# Patient Record
Sex: MALE | Race: OTHER | Employment: FULL TIME | URBAN - METROPOLITAN AREA
[De-identification: names, ages, dates, MRNs, and addresses within clinical notes are randomized per-mention and may not be internally consistent; named-entity substitution may affect disease eponyms.]

---

## 2018-08-04 PROCEDURE — 82570 ASSAY OF URINE CREATININE: CPT | Performed by: INTERNAL MEDICINE

## 2018-08-04 PROCEDURE — 82043 UR ALBUMIN QUANTITATIVE: CPT | Performed by: INTERNAL MEDICINE

## 2018-08-04 PROCEDURE — 80053 COMPREHEN METABOLIC PANEL: CPT | Performed by: INTERNAL MEDICINE

## 2018-08-04 PROCEDURE — 82306 VITAMIN D 25 HYDROXY: CPT | Performed by: INTERNAL MEDICINE

## 2018-08-04 PROCEDURE — 83036 HEMOGLOBIN GLYCOSYLATED A1C: CPT | Performed by: INTERNAL MEDICINE

## 2018-08-04 PROCEDURE — 80061 LIPID PANEL: CPT | Performed by: INTERNAL MEDICINE

## 2018-08-04 PROCEDURE — 84443 ASSAY THYROID STIM HORMONE: CPT | Performed by: INTERNAL MEDICINE

## 2018-08-04 PROCEDURE — 85025 COMPLETE CBC W/AUTO DIFF WBC: CPT | Performed by: INTERNAL MEDICINE

## 2020-07-27 PROCEDURE — 80061 LIPID PANEL: CPT | Performed by: INTERNAL MEDICINE

## 2020-07-27 PROCEDURE — 82306 VITAMIN D 25 HYDROXY: CPT | Performed by: INTERNAL MEDICINE

## 2020-07-27 PROCEDURE — 80053 COMPREHEN METABOLIC PANEL: CPT | Performed by: INTERNAL MEDICINE

## 2020-07-27 PROCEDURE — 85025 COMPLETE CBC W/AUTO DIFF WBC: CPT | Performed by: INTERNAL MEDICINE

## 2020-07-27 PROCEDURE — 84443 ASSAY THYROID STIM HORMONE: CPT | Performed by: INTERNAL MEDICINE

## 2020-07-27 PROCEDURE — 82570 ASSAY OF URINE CREATININE: CPT | Performed by: INTERNAL MEDICINE

## 2020-07-27 PROCEDURE — 82043 UR ALBUMIN QUANTITATIVE: CPT | Performed by: INTERNAL MEDICINE

## 2020-11-25 NOTE — LETTER
[address], [place]  Authorization for Invasive Procedures  1.  I hereby authorize Dr. Cesia Gaspar , my physician and whomever may be designated as the doctor's assistant, to perform the following operation and/or procedure: inc RTC 3mo labs and renal u/s prior 4. Should the need arise during my operation or immediate post-operative period; I also consent to the administration of blood and/or blood products.  Further, I understand that despite careful testing and screening of blood and blood products, I may still 9. Patients having a sterilization procedure: I understand that if the procedure is successful the results will be permanent and it will therefore be impossible for me to inseminate, conceive or bear children.  I also understand that the procedure is intend

## 2020-12-08 ENCOUNTER — APPOINTMENT (OUTPATIENT)
Dept: CT IMAGING | Facility: HOSPITAL | Age: 45
End: 2020-12-08
Attending: EMERGENCY MEDICINE
Payer: COMMERCIAL

## 2020-12-08 ENCOUNTER — HOSPITAL ENCOUNTER (EMERGENCY)
Facility: HOSPITAL | Age: 45
Discharge: HOME | End: 2020-12-08
Attending: EMERGENCY MEDICINE
Payer: COMMERCIAL

## 2020-12-08 ENCOUNTER — HOSPITAL ENCOUNTER (OUTPATIENT)
Age: 45
Discharge: ANOTHER HOSPITAL | End: 2020-12-08
Payer: COMMERCIAL

## 2020-12-08 VITALS
BODY MASS INDEX: 30.37 KG/M2 | HEART RATE: 105 BPM | DIASTOLIC BLOOD PRESSURE: 77 MMHG | SYSTOLIC BLOOD PRESSURE: 118 MMHG | HEIGHT: 66 IN | WEIGHT: 189 LBS | OXYGEN SATURATION: 99 % | TEMPERATURE: 98 F | RESPIRATION RATE: 18 BRPM

## 2020-12-08 VITALS
OXYGEN SATURATION: 98 % | TEMPERATURE: 98 F | SYSTOLIC BLOOD PRESSURE: 122 MMHG | HEART RATE: 80 BPM | WEIGHT: 189 LBS | RESPIRATION RATE: 18 BRPM | DIASTOLIC BLOOD PRESSURE: 77 MMHG | BODY MASS INDEX: 30.37 KG/M2 | HEIGHT: 66 IN

## 2020-12-08 DIAGNOSIS — L03.317 CELLULITIS AND ABSCESS OF BUTTOCK: ICD-10-CM

## 2020-12-08 DIAGNOSIS — K62.89 RECTAL PAIN: Primary | ICD-10-CM

## 2020-12-08 DIAGNOSIS — L03.317 CELLULITIS OF GLUTEAL REGION: Primary | ICD-10-CM

## 2020-12-08 DIAGNOSIS — L02.31 CELLULITIS AND ABSCESS OF BUTTOCK: ICD-10-CM

## 2020-12-08 PROCEDURE — 85025 COMPLETE CBC W/AUTO DIFF WBC: CPT | Performed by: EMERGENCY MEDICINE

## 2020-12-08 PROCEDURE — 80048 BASIC METABOLIC PNL TOTAL CA: CPT | Performed by: EMERGENCY MEDICINE

## 2020-12-08 PROCEDURE — S0077 INJECTION, CLINDAMYCIN PHOSP: HCPCS | Performed by: EMERGENCY MEDICINE

## 2020-12-08 PROCEDURE — 99214 OFFICE O/P EST MOD 30 MIN: CPT | Performed by: EMERGENCY MEDICINE

## 2020-12-08 PROCEDURE — 82962 GLUCOSE BLOOD TEST: CPT

## 2020-12-08 PROCEDURE — 96365 THER/PROPH/DIAG IV INF INIT: CPT

## 2020-12-08 PROCEDURE — 74177 CT ABD & PELVIS W/CONTRAST: CPT | Performed by: EMERGENCY MEDICINE

## 2020-12-08 PROCEDURE — 99284 EMERGENCY DEPT VISIT MOD MDM: CPT

## 2020-12-08 RX ORDER — HYDROCODONE BITARTRATE AND ACETAMINOPHEN 5; 325 MG/1; MG/1
1-2 TABLET ORAL EVERY 6 HOURS PRN
Qty: 10 TABLET | Refills: 0 | Status: ON HOLD | OUTPATIENT
Start: 2020-12-08 | End: 2020-12-13

## 2020-12-08 RX ORDER — HYDROCODONE BITARTRATE AND ACETAMINOPHEN 5; 325 MG/1; MG/1
1 TABLET ORAL ONCE
Status: COMPLETED | OUTPATIENT
Start: 2020-12-08 | End: 2020-12-08

## 2020-12-08 RX ORDER — DEXTROSE MONOHYDRATE 25 G/50ML
INJECTION, SOLUTION INTRAVENOUS
Status: COMPLETED
Start: 2020-12-08 | End: 2020-12-08

## 2020-12-08 RX ORDER — CLINDAMYCIN PHOSPHATE 600 MG/50ML
600 INJECTION INTRAVENOUS ONCE
Status: COMPLETED | OUTPATIENT
Start: 2020-12-08 | End: 2020-12-08

## 2020-12-08 RX ORDER — CLINDAMYCIN HYDROCHLORIDE 300 MG/1
600 CAPSULE ORAL 3 TIMES DAILY
Qty: 60 CAPSULE | Refills: 0 | Status: ON HOLD | OUTPATIENT
Start: 2020-12-08 | End: 2020-12-13

## 2020-12-09 NOTE — ED INITIAL ASSESSMENT (HPI)
Pt complaining of possible abscess for 5 days on the rectal area extending to the right buttock. No fever. No drainage. +redness and swelling and pain.

## 2020-12-09 NOTE — ED PROVIDER NOTES
Patient Seen in: [facility]      History   Patient presents with:  Abscess    Stated Complaint: pain buttocks    HPI  Patient complains of a tender swollen area on the right buttock for the last for 5 days. Progressively getting worse.   Now General: He is not in acute distress. Appearance: He is well-developed. Comments: Well appearing   HENT:      Head: Normocephalic and atraumatic.       Right Ear: External ear normal.      Left Ear: External ear normal.   Eyes:      Conjunctiva/

## 2020-12-09 NOTE — ED PROVIDER NOTES
Patient Seen in: [facility] [place] AND [facility] Emergency Department      History   Patient presents with:  Abscess    Stated Complaint: abscess    HPI    Patient is a 69-year-old male who presents with gluteal abscess x5 days.   He is diabetic and states his glucoses for the following components:       Result Value    Glucose 64 (*)     Sodium 133 (*)     All other components within normal limits   POCT GLUCOSE - Abnormal; Notable for the following components:    POC Glucose  66 (*)     All other components within norm abscess. Patient notified of results. No visible abscess on CAT scan. Patient given IV clindamycin and dextrose. Advised close follow-up with PCP or to return to emergency department for fevers or worsening erythema.   Will send home on oral antibiot

## 2020-12-10 ENCOUNTER — HOSPITAL ENCOUNTER (INPATIENT)
Facility: HOSPITAL | Age: 45
LOS: 2 days | Discharge: HOME | DRG: 357 | End: 2020-12-13
Attending: EMERGENCY MEDICINE | Admitting: HOSPITALIST
Payer: COMMERCIAL

## 2020-12-10 DIAGNOSIS — L02.31 ABSCESS OF RIGHT BUTTOCK: ICD-10-CM

## 2020-12-10 DIAGNOSIS — L03.317 CELLULITIS OF RIGHT BUTTOCK: Primary | ICD-10-CM

## 2020-12-10 DIAGNOSIS — L02.91 ABSCESS: ICD-10-CM

## 2020-12-10 RX ORDER — LIDOCAINE HYDROCHLORIDE AND EPINEPHRINE 20; 5 MG/ML; UG/ML
INJECTION, SOLUTION EPIDURAL; INFILTRATION; INTRACAUDAL; PERINEURAL
Status: COMPLETED
Start: 2020-12-10 | End: 2020-12-10

## 2020-12-10 RX ORDER — LIDOCAINE HYDROCHLORIDE AND EPINEPHRINE 20; 5 MG/ML; UG/ML
20 INJECTION, SOLUTION EPIDURAL; INFILTRATION; INTRACAUDAL; PERINEURAL ONCE
Status: COMPLETED | OUTPATIENT
Start: 2020-12-10 | End: 2020-12-10

## 2020-12-10 RX ORDER — ONDANSETRON 2 MG/ML
4 INJECTION INTRAMUSCULAR; INTRAVENOUS ONCE
Status: COMPLETED | OUTPATIENT
Start: 2020-12-10 | End: 2020-12-10

## 2020-12-10 RX ORDER — MORPHINE SULFATE 4 MG/ML
4 INJECTION, SOLUTION INTRAMUSCULAR; INTRAVENOUS ONCE
Status: COMPLETED | OUTPATIENT
Start: 2020-12-10 | End: 2020-12-10

## 2020-12-11 ENCOUNTER — ANESTHESIA EVENT (OUTPATIENT)
Dept: SURGERY | Facility: HOSPITAL | Age: 45
DRG: 357 | End: 2020-12-11
Payer: COMMERCIAL

## 2020-12-11 ENCOUNTER — ANESTHESIA (OUTPATIENT)
Dept: SURGERY | Facility: HOSPITAL | Age: 45
DRG: 357 | End: 2020-12-11
Payer: COMMERCIAL

## 2020-12-11 PROBLEM — L02.31 ABSCESS OF RIGHT BUTTOCK: Status: ACTIVE | Noted: 2020-12-11

## 2020-12-11 PROCEDURE — 0JB90ZZ EXCISION OF BUTTOCK SUBCUTANEOUS TISSUE AND FASCIA, OPEN APPROACH: ICD-10-PCS | Performed by: SURGERY

## 2020-12-11 PROCEDURE — 0H98XZZ DRAINAGE OF BUTTOCK SKIN, EXTERNAL APPROACH: ICD-10-PCS | Performed by: EMERGENCY MEDICINE

## 2020-12-11 RX ORDER — SODIUM CHLORIDE, SODIUM LACTATE, POTASSIUM CHLORIDE, CALCIUM CHLORIDE 600; 310; 30; 20 MG/100ML; MG/100ML; MG/100ML; MG/100ML
INJECTION, SOLUTION INTRAVENOUS CONTINUOUS
Status: DISCONTINUED | OUTPATIENT
Start: 2020-12-11 | End: 2020-12-11 | Stop reason: HOSPADM

## 2020-12-11 RX ORDER — HYDROMORPHONE HYDROCHLORIDE 1 MG/ML
0.6 INJECTION, SOLUTION INTRAMUSCULAR; INTRAVENOUS; SUBCUTANEOUS EVERY 5 MIN PRN
Status: DISCONTINUED | OUTPATIENT
Start: 2020-12-11 | End: 2020-12-11 | Stop reason: HOSPADM

## 2020-12-11 RX ORDER — HYDROMORPHONE HYDROCHLORIDE 1 MG/ML
0.4 INJECTION, SOLUTION INTRAMUSCULAR; INTRAVENOUS; SUBCUTANEOUS EVERY 5 MIN PRN
Status: DISCONTINUED | OUTPATIENT
Start: 2020-12-11 | End: 2020-12-11 | Stop reason: HOSPADM

## 2020-12-11 RX ORDER — MIDAZOLAM HYDROCHLORIDE 1 MG/ML
INJECTION INTRAMUSCULAR; INTRAVENOUS AS NEEDED
Status: DISCONTINUED | OUTPATIENT
Start: 2020-12-11 | End: 2020-12-11 | Stop reason: SURG

## 2020-12-11 RX ORDER — SODIUM CHLORIDE, SODIUM LACTATE, POTASSIUM CHLORIDE, CALCIUM CHLORIDE 600; 310; 30; 20 MG/100ML; MG/100ML; MG/100ML; MG/100ML
INJECTION, SOLUTION INTRAVENOUS CONTINUOUS PRN
Status: DISCONTINUED | OUTPATIENT
Start: 2020-12-11 | End: 2020-12-11 | Stop reason: SURG

## 2020-12-11 RX ORDER — NALOXONE HYDROCHLORIDE 0.4 MG/ML
80 INJECTION, SOLUTION INTRAMUSCULAR; INTRAVENOUS; SUBCUTANEOUS AS NEEDED
Status: DISCONTINUED | OUTPATIENT
Start: 2020-12-11 | End: 2020-12-11 | Stop reason: HOSPADM

## 2020-12-11 RX ORDER — DEXTROSE MONOHYDRATE 25 G/50ML
50 INJECTION, SOLUTION INTRAVENOUS
Status: DISCONTINUED | OUTPATIENT
Start: 2020-12-11 | End: 2020-12-11 | Stop reason: HOSPADM

## 2020-12-11 RX ORDER — HALOPERIDOL 5 MG/ML
0.25 INJECTION INTRAMUSCULAR ONCE AS NEEDED
Status: DISCONTINUED | OUTPATIENT
Start: 2020-12-11 | End: 2020-12-11 | Stop reason: HOSPADM

## 2020-12-11 RX ORDER — ONDANSETRON 2 MG/ML
4 INJECTION INTRAMUSCULAR; INTRAVENOUS ONCE AS NEEDED
Status: DISCONTINUED | OUTPATIENT
Start: 2020-12-11 | End: 2020-12-11 | Stop reason: HOSPADM

## 2020-12-11 RX ORDER — PROCHLORPERAZINE EDISYLATE 5 MG/ML
5 INJECTION INTRAMUSCULAR; INTRAVENOUS ONCE AS NEEDED
Status: DISCONTINUED | OUTPATIENT
Start: 2020-12-11 | End: 2020-12-11 | Stop reason: HOSPADM

## 2020-12-11 RX ORDER — HYDROMORPHONE HYDROCHLORIDE 1 MG/ML
0.2 INJECTION, SOLUTION INTRAMUSCULAR; INTRAVENOUS; SUBCUTANEOUS EVERY 5 MIN PRN
Status: DISCONTINUED | OUTPATIENT
Start: 2020-12-11 | End: 2020-12-11 | Stop reason: HOSPADM

## 2020-12-11 RX ORDER — MORPHINE SULFATE 4 MG/ML
2 INJECTION, SOLUTION INTRAMUSCULAR; INTRAVENOUS EVERY 10 MIN PRN
Status: DISCONTINUED | OUTPATIENT
Start: 2020-12-11 | End: 2020-12-11 | Stop reason: HOSPADM

## 2020-12-11 RX ORDER — LISINOPRIL 5 MG/1
5 TABLET ORAL DAILY
Status: DISCONTINUED | OUTPATIENT
Start: 2020-12-11 | End: 2020-12-13

## 2020-12-11 RX ORDER — ONDANSETRON 2 MG/ML
INJECTION INTRAMUSCULAR; INTRAVENOUS AS NEEDED
Status: DISCONTINUED | OUTPATIENT
Start: 2020-12-11 | End: 2020-12-11 | Stop reason: SURG

## 2020-12-11 RX ORDER — HYDROCODONE BITARTRATE AND ACETAMINOPHEN 10; 325 MG/1; MG/1
1 TABLET ORAL EVERY 4 HOURS PRN
Status: DISCONTINUED | OUTPATIENT
Start: 2020-12-11 | End: 2020-12-13

## 2020-12-11 RX ORDER — MORPHINE SULFATE 10 MG/ML
6 INJECTION, SOLUTION INTRAMUSCULAR; INTRAVENOUS EVERY 10 MIN PRN
Status: DISCONTINUED | OUTPATIENT
Start: 2020-12-11 | End: 2020-12-11 | Stop reason: HOSPADM

## 2020-12-11 RX ORDER — MORPHINE SULFATE 2 MG/ML
1 INJECTION, SOLUTION INTRAMUSCULAR; INTRAVENOUS EVERY 2 HOUR PRN
Status: DISCONTINUED | OUTPATIENT
Start: 2020-12-11 | End: 2020-12-13

## 2020-12-11 RX ORDER — DEXTROSE MONOHYDRATE 25 G/50ML
50 INJECTION, SOLUTION INTRAVENOUS
Status: DISCONTINUED | OUTPATIENT
Start: 2020-12-11 | End: 2020-12-13

## 2020-12-11 RX ORDER — LIDOCAINE HYDROCHLORIDE 10 MG/ML
INJECTION, SOLUTION EPIDURAL; INFILTRATION; INTRACAUDAL; PERINEURAL AS NEEDED
Status: DISCONTINUED | OUTPATIENT
Start: 2020-12-11 | End: 2020-12-11 | Stop reason: SURG

## 2020-12-11 RX ORDER — MORPHINE SULFATE 4 MG/ML
4 INJECTION, SOLUTION INTRAMUSCULAR; INTRAVENOUS EVERY 10 MIN PRN
Status: DISCONTINUED | OUTPATIENT
Start: 2020-12-11 | End: 2020-12-11 | Stop reason: HOSPADM

## 2020-12-11 RX ORDER — HYDROCODONE BITARTRATE AND ACETAMINOPHEN 5; 325 MG/1; MG/1
2 TABLET ORAL AS NEEDED
Status: DISCONTINUED | OUTPATIENT
Start: 2020-12-11 | End: 2020-12-11 | Stop reason: HOSPADM

## 2020-12-11 RX ORDER — HYDROCODONE BITARTRATE AND ACETAMINOPHEN 5; 325 MG/1; MG/1
1 TABLET ORAL AS NEEDED
Status: DISCONTINUED | OUTPATIENT
Start: 2020-12-11 | End: 2020-12-11 | Stop reason: HOSPADM

## 2020-12-11 RX ADMIN — SODIUM CHLORIDE, SODIUM LACTATE, POTASSIUM CHLORIDE, CALCIUM CHLORIDE: 600; 310; 30; 20 INJECTION, SOLUTION INTRAVENOUS at 09:57:00

## 2020-12-11 RX ADMIN — SODIUM CHLORIDE, SODIUM LACTATE, POTASSIUM CHLORIDE, CALCIUM CHLORIDE: 600; 310; 30; 20 INJECTION, SOLUTION INTRAVENOUS at 09:15:00

## 2020-12-11 RX ADMIN — MIDAZOLAM HYDROCHLORIDE 2 MG: 1 INJECTION INTRAMUSCULAR; INTRAVENOUS at 09:15:00

## 2020-12-11 RX ADMIN — LIDOCAINE HYDROCHLORIDE 50 MG: 10 INJECTION, SOLUTION EPIDURAL; INFILTRATION; INTRACAUDAL; PERINEURAL at 09:24:00

## 2020-12-11 RX ADMIN — ONDANSETRON 4 MG: 2 INJECTION INTRAMUSCULAR; INTRAVENOUS at 09:30:00

## 2020-12-11 NOTE — ED PROVIDER NOTES
Patient Seen in: [facility] AND [facility] Emergency Department    History   Patient presents with:  Cellulitis    Stated Complaint: pain from infection    HPI    Patient is here 2 days after being seen here for buttock cellulitis.   He reports it is getting much E11.8, E11.65         Review of Systems  Constitutional: no fever  Cardiovascular: no chest pain  Respiratory: no shortness of breath  Gastrointestinal: no abdominal pain      Positive for stated complaint: pain from infection  Other systems are as noted i to open up areas he was having a significant amount of pain with this. I did extrude pus and then packed with half-inch iodoform gauze to both areas. We will do blood cultures as well. I have paged primary care physician Dr. Jamee Cherry for admission.   I di primary care provider within the next three months to obtain basic health screening including reassessment of your blood pressure.       Clinical Impression:  Cellulitis of right buttock  (primary encounter diagnosis)  Abscess of right buttock    Dispositio

## 2020-12-11 NOTE — CONSULTS
[facility]    Report of Consultation    Blinda Noxubee Patient Status:  Inpatient    1975 MRN [id number]   Location [facility] 4W/SW/SE Attending Ovidio Martinez MD   Hosp Day # 0 PCP Germán Perez MD     Date of Admiss Min PRN  •  lisinopril tab 5 mg, 5 mg, Oral, Daily  •  Insulin Regular Human (NOVOLIN R) 100 UNIT/ML injection 1-5 Units, 1-5 Units, Subcutaneous, Q6H [address]    Review of Systems:  A comprehensive review of systems was negative.     Physical Exam:  Blood press in the operating room  Pt agrees    Time spent on counseling/coordination of care:  51374- 55 min    Total time spent with patient:  1 Hour 15 Minutes    MISAEL ALBRIGHT  12/11/2020  7:09 AM

## 2020-12-11 NOTE — PLAN OF CARE
Patient doing well overall. Went for surgery in AM. Awake and alert upon arrival back to unit. Pain managed with Norco prn. Surgical dressings and briefs remain in place, old drainage noted. Will reinforce as needed. Tolerating general diet.  Able to void w ADULT  Goal: Verbalizes/displays adequate comfort level or patient's stated pain goal  Description: INTERVENTIONS:  - Encourage pt to monitor pain and request assistance  - Assess pain using appropriate pain scale  - Administer analgesics based on type and

## 2020-12-11 NOTE — H&P
[facility] HOSP - [facility]    History and Physical    Mary Beth Santiago Patient Status:  Inpatient    1975 MRN [id number]   Location [facility] 4W/SW/SE Attending Alexy Vo MD   Hosp Day # 0 PCP Anish Toro MD     Date:  2020 •  clotrimazole-betamethasone 1-0.05 % External Cream, Apply 1 Application topically 2 (two) times daily as needed. •  Tadalafil (CIALIS) 20 MG Oral Tab, Take 1 tablet (20 mg total) by mouth Every other day PRN for Erectile Dysfunction.     •  Glucose Bl GLU 84 12/11/2020    CA 8.8 12/11/2020    ALB 2.5 (L) 12/11/2020    ALKPHO 88 12/11/2020    BILT 0.4 12/11/2020    TP 6.8 12/11/2020    AST 22 12/11/2020    ALT 24 12/11/2020    T4F 1.4 12/11/2020    TSH 1.560 12/11/2020    PSA 0.9 08/04/2018    CRP 6.83

## 2020-12-11 NOTE — PLAN OF CARE
Patient is alert and oriented times 4, on room air, ambulating independently. Primarily Dutch Speaking- understands some English. Right inner buttock wound noted - packed by ED MD. Right buttock is red, painful per patient, and warm to touch.  PRN Norco a cultural backgrounds into the planning and delivery of care  - Encourage patient/family to participate in care and decision-making at the level they choose  - Honor patient and family perspectives and choices  Outcome: Progressing

## 2020-12-11 NOTE — BRIEF OP NOTE
Pre-Operative Diagnosis: Abscess [L02.91]     Post-Operative Diagnosis: Abscess [L02.91]      Procedure Performed:   Procedure(s):  Incision and Drainage of Extensive right sided Gluteal Abcess    Surgeon(s) and Role:     * Roselia Blake MD - Melany Palafox

## 2020-12-11 NOTE — ED INITIAL ASSESSMENT (HPI)
Patient presents to ED with c/o of right buttock redness and pain. Patient states he was seen here 2 days ago with same issue and prescribed antibiotics. Patient states the redness and pain are getting worse. Hx diabetes.

## 2020-12-11 NOTE — ANESTHESIA POSTPROCEDURE EVALUATION
Patient: David Salcido    Procedure Summary     Date: 12/11/20 Room / Location: [facility] OR 02 / [facility] OR    Anesthesia Start: 0915 Anesthesia Stop: 4830    Procedure: INCISION AND DRAINAGE (Right Buttocks) Diagnosis:       Abscess      (Abscess [L

## 2020-12-11 NOTE — OPERATIVE REPORT
[facility]    PATIENT'S NAME: Cecilio Tj   ATTENDING PHYSICIAN: Chucho Delgado MD   OPERATING PHYSICIAN: Sirena Calderón.  Nico Faria MD   PATIENT ACCOUNT#:   [id number]    LOCATION:  [facility] [address] PACU 2 [facility] 10  MEDICAL RECORD #:   [id number] to 6 inches of packing daily. He can likely go home tomorrow. Dictated By Josh Hudson.  Afia Taylor MD  d: 12/11/2020 09:54:16  t: 12/11/2020 10:59:13  Job [id number]  SPB/

## 2020-12-11 NOTE — ED NOTES
Orders for admission, patient is aware of plan and ready to go upstairs. Any questions, please call ED RN Pily Elizalde  at extension [phone].      Type of COVID test sent: Rapid  COVID Suspicion level: Low  Drug(s) infusing: Zosyn IVPB  LOC at time of transport:

## 2020-12-11 NOTE — ANESTHESIA PREPROCEDURE EVALUATION
Anesthesia PreOp Note    HPI:     Kushal Pendleton is a 50year old male who presents for preoperative consultation requested by: Paz Garcia MD    Date of Surgery: 12/10/2020 - 12/11/2020    Procedure(s):  INCISION AND DRAINAGE  Indication: A HYDROcodone-acetaminophen 5-325 MG Oral Tab, Take 1-2 tablets by mouth every 6 (six) hours as needed for Pain., Disp: 10 tablet, Rfl: 0, Unknown at Unknown time    •  GLYXAMBI 25-5 MG Oral Tab, TAKE 1 TABLET BY MOUTH DAILY, Disp: 90 tablet, Rfl: 0    •  pr MD    Or    •  [MAR Hold] Glucose-Vitamin C (DEX-4) chewable tab 8 tablet, 8 tablet, Oral, Q15 Min PRN, Dom Mancera MD    •  [MAR Hold] lisinopril tab 5 mg, 5 mg, Oral, Daily, Dayan Maguire MD    •  [MAR Hold] Insulin Regular Human (NOVOLIN R) 10 sexual activity: Not on file    Other Topics      Concerns:        Caffeine Concern: No        Exercise: No        Seat Belt: No        Special Diet: No        Stress Concern: No        Weight Concern: No    Social History Narrative      Not on file      A the anesthetic plan, major risks and alternatives with the patient and answered all questions. The patient desires to proceed with surgery and anesthesia as planned.    Informed Consent Plan and Risks Discussed With:  Patient      I have informed Karla Thurston Vi

## 2020-12-11 NOTE — ANESTHESIA PROCEDURE NOTES
Airway  Urgency: Elective      General Information and Staff    Patient location during procedure: OR  Anesthesiologist: Cinthya Walker MD  Resident/CRNA: Hali Avila CRNA  Performed: CRNA     Indications and Patient Condition  Indications for ai

## 2020-12-12 PROCEDURE — 99233 SBSQ HOSP IP/OBS HIGH 50: CPT | Performed by: HOSPITALIST

## 2020-12-12 NOTE — CM/SW NOTE
Spoke with Dr. Junior Barr and informed her of pt's insurance and of need for [address] [place], [place] [address]ist to be pt's attending while hospitalized and that [address] [place], [place] [address]ist will be seeing patient for remainder of his hospitalization as his attending. Dr. Junior Barr v/u.

## 2020-12-12 NOTE — CM/SW NOTE
Paged Dr. Branden Burr o/c for Dr. Hernando Walton to inform her of patient's insurance and of need for [address] [place], [place] [address]ist to be pt's attending while hospitalized. Awaiting call back from Dr. Branden Burr.

## 2020-12-12 NOTE — PROGRESS NOTES
JEOVANY FEELS WELL  LESS PAIN  NO F/C  DRESSING CLEAN AND DRY    CULTURES PENDING    PLAN:  OK TO GO HOME              PO ABX PER ID              CONT TO REMOVE PACKING DAILY              F/U WITH ME IN ROUGHLY 2 WEEKS

## 2020-12-12 NOTE — CONSULTS
[facility] ID CONSULT NOTE    Chintan Files Patient Status:  Inpatient    1975 MRN [id number]   Location [facility] 4W/SW/SE Attending Bethany Aguila MD   Hosp Day # 1 PCP Baker Dance, MD Rollo Percy •  glucose (DEX4) oral liquid 15 g, 15 g, Oral, Q15 Min PRN **OR** Glucose-Vitamin C (DEX-4) chewable tab 4 tablet, 4 tablet, Oral, Q15 Min PRN **OR** dextrose 50 % injection 50 mL, 50 mL, Intravenous, Q15 Min PRN **OR** glucose (DEX4) oral liquid 30 g, 30 Integument: R perirectal region with packing in place with bloody drainage with mild surrounding erythema  Lines: PIV+    Laboratory Data:  Recent Labs   Lab 12/11/20  0644   RBC 4.06*   HGB 13.0   HCT 39.6   MCV 97.5   MCH 32.0   MCHC 32.8   RDW 11.6   NE ID ATTENDING ADDENDUM     Pt seen an examined independently. Chart reviewed. Agree with above. Note has been reviewed by me and modified as needed. Exam and Impression/ Recs as noted above.   Feels better but still with significant pain, particularly with

## 2020-12-12 NOTE — PLAN OF CARE
Patient alert and oriented, primarily [address] speaking. Increased pain when ambulating but otherwise controled with PRN norco. Tolerating diet, denies nausea, ACHS. Zosyn given. Dressing CDI. Call light within reach, using appropriately.      Problem: Diabe INFECTION - ADULT  Goal: Absence of fever/infection during anticipated neutropenic period  Description: INTERVENTIONS  - Monitor WBC  - Administer growth factors as ordered  - Implement neutropenic guidelines  Outcome: Progressing

## 2020-12-12 NOTE — PLAN OF CARE
Problem: Patient Centered Care  Goal: Patient preferences are identified and integrated in the patient's plan of care  Description: Interventions:  - What would you like us to know as we care for you?  I was here recently with same problem  - Provide time cultural and social influences on pain and pain management  - Manage/alleviate anxiety  - Utilize distraction and/or relaxation techniques  - Monitor for opioid side effects  - Notify MD/LIP if interventions unsuccessful or patient reports new pain  - Anti

## 2020-12-12 NOTE — CM/SW NOTE
Patient has [employer] insurance. Currently patient has Dr. Artem Mueller assigned as his attending, however due to patient's insurance [facility]  Paged Dr. Memo Jones to inform her of th

## 2020-12-12 NOTE — CM/SW NOTE
Spoke with Dr. Carl Lombardo, informed her of pt's insurance and [facility]. Dr. Carl Lombardo agreeable to accept patient. ERCM changed attending to Dr. Carl Lombardo in event management.  ERCM will notify Dr. Jolly Jacobsen of the [place] C[place]

## 2020-12-12 NOTE — PROGRESS NOTES
[facility] - [facility]    Progress Note    Kimmie Han Patient Status:  Inpatient    1975 MRN [id number]   Location [facility] 4W/SW/SE Attending Maura Garrett MD   Hosp Day # 1 PCP Riky Thomas MD        Subjective:   Sub (H) 12/11/2020       No results found.                Katherleen Hodgkin, MD  12/12/2020

## 2020-12-13 VITALS
RESPIRATION RATE: 18 BRPM | HEART RATE: 76 BPM | DIASTOLIC BLOOD PRESSURE: 84 MMHG | HEIGHT: 66 IN | BODY MASS INDEX: 28.13 KG/M2 | TEMPERATURE: 98 F | OXYGEN SATURATION: 99 % | SYSTOLIC BLOOD PRESSURE: 113 MMHG | WEIGHT: 175 LBS

## 2020-12-13 PROCEDURE — 99239 HOSP IP/OBS DSCHRG MGMT >30: CPT | Performed by: HOSPITALIST

## 2020-12-13 RX ORDER — VANCOMYCIN HYDROCHLORIDE 125 MG/1
125 CAPSULE ORAL DAILY
Qty: 10 CAPSULE | Refills: 0 | Status: SHIPPED | OUTPATIENT
Start: 2020-12-13 | End: 2021-01-11 | Stop reason: ALTCHOICE

## 2020-12-13 RX ORDER — HYDROCODONE BITARTRATE AND ACETAMINOPHEN 10; 325 MG/1; MG/1
1 TABLET ORAL EVERY 4 HOURS PRN
Qty: 20 TABLET | Refills: 0 | Status: SHIPPED | OUTPATIENT
Start: 2020-12-13 | End: 2021-01-11 | Stop reason: ALTCHOICE

## 2020-12-13 RX ORDER — AMOXICILLIN AND CLAVULANATE POTASSIUM 875; 125 MG/1; MG/1
1 TABLET, FILM COATED ORAL 2 TIMES DAILY
Qty: 20 TABLET | Refills: 0 | Status: SHIPPED | OUTPATIENT
Start: 2020-12-13 | End: 2020-12-23

## 2020-12-13 NOTE — PLAN OF CARE
Patient alert and oriented, primarily [address] speaking. Increased pain when ambulating but otherwise controled with PRN norco and repositining. Tolerating diet, denies nausea, ACHS. Zosyn given. Dressing CDI, discussed home care with wife and patient.  Call Anticipate increased pain with activity and pre-medicate as appropriate  Outcome: Adequate for Discharge     Problem: RISK FOR INFECTION - ADULT  Goal: Absence of fever/infection during anticipated neutropenic period  Description: INTERVENTIONS  - Monitor

## 2020-12-14 ENCOUNTER — TELEPHONE (OUTPATIENT)
Dept: MEDSURG UNIT | Facility: HOSPITAL | Age: 45
End: 2020-12-14

## 2020-12-15 NOTE — DISCHARGE SUMMARY
[facility]    PATIENT'S NAME: Bharati Handy PHYSICIAN: Shelley Sahu MD   PATIENT ACCOUNT#:   [id number]    LOCATION:  [facility] [address] [address] #:   [id number]       YOB: 1975  ADMISSION DATE:       15 SIGNS:  Reviewed per patient's chart, currently stable. HEENT:  Extraocular movements are intact. Pupils equal, round, and reactive to light and accommodation. Atraumatic, normocephalic. LUNGS:  Good air entry bilaterally. HEART:  S1, S2 appreciated.

## 2022-04-27 PROBLEM — E11.65 TYPE 2 DIABETES MELLITUS WITH HYPERGLYCEMIA (HCC): Status: ACTIVE | Noted: 2022-04-27

## 2023-02-02 PROCEDURE — 84443 ASSAY THYROID STIM HORMONE: CPT | Performed by: INTERNAL MEDICINE

## 2023-02-02 PROCEDURE — 83036 HEMOGLOBIN GLYCOSYLATED A1C: CPT | Performed by: INTERNAL MEDICINE

## 2023-02-02 PROCEDURE — 80053 COMPREHEN METABOLIC PANEL: CPT | Performed by: INTERNAL MEDICINE

## 2023-02-02 PROCEDURE — 82043 UR ALBUMIN QUANTITATIVE: CPT | Performed by: INTERNAL MEDICINE

## 2023-02-02 PROCEDURE — 85025 COMPLETE CBC W/AUTO DIFF WBC: CPT | Performed by: INTERNAL MEDICINE

## 2023-02-02 PROCEDURE — 82306 VITAMIN D 25 HYDROXY: CPT | Performed by: INTERNAL MEDICINE

## 2023-02-02 PROCEDURE — 80061 LIPID PANEL: CPT | Performed by: INTERNAL MEDICINE

## 2023-02-02 PROCEDURE — 82570 ASSAY OF URINE CREATININE: CPT | Performed by: INTERNAL MEDICINE

## 2023-08-09 ENCOUNTER — LAB ENCOUNTER (OUTPATIENT)
Dept: LAB | Age: 48
End: 2023-08-09
Attending: INTERNAL MEDICINE
Payer: COMMERCIAL

## 2023-08-09 DIAGNOSIS — E78.00 HYPERCHOLESTEROLEMIA: ICD-10-CM

## 2023-08-09 DIAGNOSIS — E11.9 TYPE 2 DIABETES MELLITUS WITHOUT COMPLICATION, UNSPECIFIED WHETHER LONG TERM INSULIN USE (HCC): ICD-10-CM

## 2023-08-09 LAB
ALBUMIN SERPL-MCNC: 3.6 G/DL (ref 3.4–5)
ALBUMIN/GLOB SERPL: 0.9 {RATIO} (ref 1–2)
ALP LIVER SERPL-CCNC: 85 U/L
ALT SERPL-CCNC: 51 U/L
ANION GAP SERPL CALC-SCNC: 6 MMOL/L (ref 0–18)
AST SERPL-CCNC: 36 U/L (ref 15–37)
BILIRUB SERPL-MCNC: 0.7 MG/DL (ref 0.1–2)
BUN BLD-MCNC: 14 MG/DL (ref 7–18)
CALCIUM BLD-MCNC: 8.7 MG/DL (ref 8.5–10.1)
CHLORIDE SERPL-SCNC: 99 MMOL/L (ref 98–112)
CHOLEST SERPL-MCNC: 236 MG/DL (ref ?–200)
CO2 SERPL-SCNC: 27 MMOL/L (ref 21–32)
CREAT BLD-MCNC: 0.8 MG/DL
EGFRCR SERPLBLD CKD-EPI 2021: 108 ML/MIN/1.73M2 (ref 60–?)
EST. AVERAGE GLUCOSE BLD GHB EST-MCNC: 280 MG/DL (ref 68–126)
FASTING PATIENT LIPID ANSWER: YES
FASTING STATUS PATIENT QL REPORTED: YES
GLOBULIN PLAS-MCNC: 3.8 G/DL (ref 2.8–4.4)
GLUCOSE BLD-MCNC: 197 MG/DL (ref 70–99)
HBA1C MFR BLD: 11.4 % (ref ?–5.7)
HDLC SERPL-MCNC: 68 MG/DL (ref 40–59)
LDLC SERPL CALC-MCNC: 135 MG/DL (ref ?–100)
NONHDLC SERPL-MCNC: 168 MG/DL (ref ?–130)
OSMOLALITY SERPL CALC.SUM OF ELEC: 280 MOSM/KG (ref 275–295)
POTASSIUM SERPL-SCNC: 4.3 MMOL/L (ref 3.5–5.1)
PROT SERPL-MCNC: 7.4 G/DL (ref 6.4–8.2)
SODIUM SERPL-SCNC: 132 MMOL/L (ref 136–145)
TRIGL SERPL-MCNC: 186 MG/DL (ref 30–149)
VLDLC SERPL CALC-MCNC: 34 MG/DL (ref 0–30)

## 2023-08-09 PROCEDURE — 80061 LIPID PANEL: CPT

## 2023-08-09 PROCEDURE — 83036 HEMOGLOBIN GLYCOSYLATED A1C: CPT

## 2023-08-09 PROCEDURE — 36415 COLL VENOUS BLD VENIPUNCTURE: CPT

## 2023-08-09 PROCEDURE — 80053 COMPREHEN METABOLIC PANEL: CPT

## 2023-10-30 PROCEDURE — 85025 COMPLETE CBC W/AUTO DIFF WBC: CPT | Performed by: INTERNAL MEDICINE

## 2023-10-30 PROCEDURE — 80053 COMPREHEN METABOLIC PANEL: CPT | Performed by: INTERNAL MEDICINE

## 2023-10-30 PROCEDURE — 83036 HEMOGLOBIN GLYCOSYLATED A1C: CPT | Performed by: INTERNAL MEDICINE

## 2024-02-12 ENCOUNTER — HOSPITAL ENCOUNTER (OUTPATIENT)
Dept: ULTRASOUND IMAGING | Age: 49
Discharge: HOME | End: 2024-02-12
Attending: INTERNAL MEDICINE
Payer: COMMERCIAL

## 2024-02-12 DIAGNOSIS — R10.9 ACUTE LEFT FLANK PAIN: ICD-10-CM

## 2024-02-12 PROCEDURE — 76775 US EXAM ABDO BACK WALL LIM: CPT | Performed by: INTERNAL MEDICINE

## 2024-05-13 PROCEDURE — 80061 LIPID PANEL: CPT

## 2024-05-13 PROCEDURE — 85025 COMPLETE CBC W/AUTO DIFF WBC: CPT

## 2024-05-13 PROCEDURE — 80053 COMPREHEN METABOLIC PANEL: CPT

## 2024-05-13 PROCEDURE — 82550 ASSAY OF CK (CPK): CPT

## 2024-05-13 PROCEDURE — 82570 ASSAY OF URINE CREATININE: CPT

## 2024-05-13 PROCEDURE — 82306 VITAMIN D 25 HYDROXY: CPT

## 2024-05-13 PROCEDURE — 83036 HEMOGLOBIN GLYCOSYLATED A1C: CPT

## 2024-05-13 PROCEDURE — 84443 ASSAY THYROID STIM HORMONE: CPT

## 2024-05-13 PROCEDURE — 82043 UR ALBUMIN QUANTITATIVE: CPT

## 2025-07-14 NOTE — PROGRESS NOTES
[facility]  Progress Note      Michael Kessler is a 50 year old male.   Chief Complaint   Patient presents with    Diabetic Foot Care      - accompanied by Nephew translating - pain - ulcers on Bilateral foot - pain rated 7/10             HPI:     Patient is a 50-year-old diabetic male with a most recent hemoglobin A1c of 9.7% who presents to clinic for bilateral preulcerative calluses.  Patient admits that the calluses build up and he usually debrides them at home.  Patient rates his pain a 7 out of 10.  He is accompanied by his nephews providing Malay translation.    Allergies: Patient has no known allergies.    Current Medications[1]   Past Medical History[2]   Past Surgical History[3]   Family History[4]   Social Hx on file[5]        REVIEW OF SYSTEMS:     Denies nause, fever, chills  No calf pain  Denies chest pain or SOB      EXAM:   There were no vitals taken for this visit.  GENERAL: well developed, well nourished, in no apparent distress  EXTREMITIES:   1. Integument: Normal skin temperature and turgor.  Preulcerative calluses with left worse than right on the plantar aspect of bilateral first metatarsal.  Signs of infection to bilateral feet  2. Vascular: Dorsalis pedis two out of four bilateral and posterior tibial pulses two out of   four bilateral, capillary refill normal.   3. Musculoskeletal: All muscle groups are graded 5 out of 5 in the foot and ankle.   4. Neurological: Normal sharp dull sensation; reflexes normal.    Bilateral barefoot skin diabetic exam is normal, visualized feet and the appearance is normal.  Bilateral monofilament/sensation of both feet is normal.  Pulsation pedal pulse exam of both lower legs/feet is normal as well.               ASSESSMENT AND PLAN:   Diagnoses and all orders for this visit:    Type 2 diabetes mellitus with foot ulcer, without long-term current use of insulin (HCC)    Pre-ulcerative calluses        Plan:           -Patient  examined, chart history reviewed.  -Discussed importance of proper pedal hygiene, regular foot checks, and tight glucose control.  -Continue sterile #15 blade the preulcerative lesions were debrided down to healthy tissue.  Advised patient to apply Betadine to the left preulcerative callus area a for the course of 1 month.  -Ambulate with supportive shoes and inserts and avoid walking barefoot.  -Educated patient on acute signs of infection advised patient to seek immediate medical attention if symptoms arise.    Follow-up in clinic in 1 month        The patient indicates understanding of these issues and agrees to the plan.        Alda Garcia DPM        [1]   Current Outpatient Medications   Medication Sig Dispense Refill    rosuvastatin 20 MG Oral Tab Take 1 tablet (20 mg total) by mouth nightly. 90 tablet 1    glimepiride 4 MG Oral Tab Take 1 tablet (4 mg total) by mouth before breakfast. 90 tablet 1    Empagliflozin-linaGLIPtin (GLYXAMBI) 25-5 MG Oral Tab Take 1 tablet by mouth daily. 90 tablet 1    Tadalafil (CIALIS) 20 MG Oral Tab Take 1 tablet (20 mg total) by mouth as needed for Erectile Dysfunction. 20 tablet 2    pregabalin (LYRICA) 50 MG Oral Cap Take 1 capsule (50 mg total) by mouth 3 (three) times daily. 90 capsule 2    clotrimazole-betamethasone 1-0.05 % External Cream Apply 1 Application  topically 2 (two) times daily as needed. 60 g 3    Finerenone (KERENDIA) 10 MG Oral Tab Take 10 mg by mouth daily. 90 tablet 1    lisinopril 5 MG Oral Tab Take 1 tablet (5 mg total) by mouth daily. 90 tablet 1    ergocalciferol 1.25 MG (38383 UT) Oral Cap Take 1 capsule (50,000 Units total) by mouth once a week. 24 capsule 1    Glucose Blood In Vitro Strip 1 each by Other route 2 (two) times daily. DX. E11.8 100 strip 5    Blood Glucose Monitoring Suppl (ACCU-CHEK KUSHAL PLUS) w/Device Does not apply Kit Check 1-2 times daily. 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 De La Fuente, EF ~45%  #continue sodium bicarbonate  650 q 8   #on calcitriol / last pth at goal / check ph level   # check iron stores   # BP well controlled  # pulmonary f/up appreciated   # will follow  Hyperlipidemia   [3] History reviewed. No pertinent surgical history.  [4]   Family History  Problem Relation Age of Onset    Diabetes Mother     Hypertension Mother     Kidney Disease Mother    [5]   Social History  Socioeconomic History    Marital status:    Tobacco Use    Smoking status: Never     Passive exposure: Never    Smokeless tobacco: Never   Vaping Use    Vaping status: Never Used   Substance and Sexual Activity    Alcohol use: Yes     Alcohol/week: 6.0 standard drinks of alcohol     Types: 6 Cans of beer per week     Comment: socially    Drug use: No   Other Topics Concern    Caffeine Concern No    Exercise No    Seat Belt No    Special Diet No    Stress Concern No    Weight Concern No

## (undated) DEVICE — TRAY SKIN PREP PVP-1

## (undated) DEVICE — ENCORE® LATEX MICRO SIZE 7.5, STERILE LATEX POWDER-FREE SURGICAL GLOVE: Brand: ENCORE

## (undated) DEVICE — SOL  .9 1000ML BTL

## (undated) DEVICE — LITHOTOMY DRAPE: Brand: CONVERTORS

## (undated) DEVICE — MINOR GENERAL: Brand: MEDLINE INDUSTRIES, INC.

## (undated) DEVICE — GAUZE,PACKING STRIP,IODOFORM,1/2"X5YD,ST: Brand: CURAD

## (undated) DEVICE — DRAPE,UNDRBUT,WHT GRAD PCH,CAPPORT,20/CS: Brand: MEDLINE

## (undated) DEVICE — MATERNITY KNIT PANTS,SEAMLESS: Brand: WINGS

## (undated) DEVICE — ABDOMINAL PAD: Brand: CURITY

## (undated) NOTE — LETTER
Date & Time: 12/8/2020, 10:42 PM  Patient: Kaushik Shanks  Encounter Provider(s):    Alberto Mao MD       To Whom It May Concern:    Kaushik Shanks was seen and treated in our department on 12/8/2020. He can return to work 12-.     I

## (undated) NOTE — LETTER
[facility]  Administration of Anesthesia  1. Devonte Alcaraz, or _________________________________ acting on his behalf, (Patient) (Dependent/Representative) request to receive anesthesia for my pending procedure/operation/treatment. 6. OBSTETRIC PATIENTS: Specific risks/consequences of spinal/epidural anesthesia may include itching, low blood pressure, difficulty urinating, slowing of the baby's heart rate and headache.  Rare risks include infections, high spinal block, spinal bleeding ___________________________________________________           _____________________________________________________  Date/Time                                                                                               Responsible person in case of minor

## (undated) NOTE — LETTER
[facility], [place]  Authorization for Invasive Procedures  1.  I hereby authorize Dr. Sathish Tyson , my physician and whomever may be designated as the doctor's assistant, to perform the following operation and/or procedure: inc 4. Should the need arise during my operation or immediate post-operative period; I also consent to the administration of blood and/or blood products.  Further, I understand that despite careful testing and screening of blood and blood products, I may still 9. Patients having a sterilization procedure: I understand that if the procedure is successful the results will be permanent and it will therefore be impossible for me to inseminate, conceive or bear children.  I also understand that the procedure is intend

## (undated) NOTE — LETTER
Hospital Discharge Documentation  Please phone to schedule a hospital follow up appointment.     From: [address] Hospitalist's Office  Phone: [phone]    Patient discharged time/date: 12/13/2020  4:44 PM  Patient discharge disposition:  Home or Sara Greater than 35 min spent with >50% spent counseling patient re: treatment and plan     Results:            Lab Results   Component Value Date     WBC 13.1 (H) 12/11/2020     HGB 13.0 12/11/2020     HCT 39.6 12/11/2020     .0 12/11/2020     CREATSER